# Patient Record
(demographics unavailable — no encounter records)

---

## 2025-02-20 NOTE — REASON FOR VISIT
[Follow-Up Visit] : a follow-up visit for [FreeTextEntry2] : painful plantar keratodermas, onychomycosis, tinea pedis

## 2025-02-20 NOTE — PROCEDURE
[FreeTextEntry1] : 1) painful plantar keratodermas: aseptic surgical debridement performed to lesions, patient recommended use of topical CLS compound at least twice daily with manual scrubbing nightly and repeat regimen consistently until cleared 2) Onychomycosis with elongated painful toenails: aseptic debridement of elongated mycotic painful toenails x 5, Rx: tavaborole solution  Patient tolerated all treatments well and without any complications

## 2025-02-20 NOTE — ASSESSMENT
[FreeTextEntry1] : 1) painful plantar keratodermas: aseptic surgical debridement performed to lesions, patient recommended use of topical CLS compound at least twice daily with manual scrubbing nightly and repeat regimen consistently until cleared 2) Onychomycosis with elongated painful toenails: aseptic debridement of elongated mycotic painful toenails x 5, Rx: tavaborole solution 3) Tinea pedis: continue topical oxiconazole BID, use cotton socks and well ventilated shoe gear  Patient tolerated all treatments well and without any complications Follow up in 2 months [Verbal] : verbal [Patient] : patient [Good - alert, interested, motivated] : Good - alert, interested, motivated [Verbalizes knowledge/Understanding] : Verbalizes knowledge/understanding [Skin Care] : skin care [Pt responsibility to plan of care] : patient responsibility to plan of care

## 2025-02-20 NOTE — HISTORY OF PRESENT ILLNESS
[FreeTextEntry1] : Mr. CHELI ELAM is a 58 year M who is seen in the office for painful plantar keratodermas, onychomycosis, tinea pedis. Patient denies any current or recent f/c/n/v/sob/chest pain. AAOx3 and in NAD. Patient mentions topical medications are helpful for the hardened keratodermas. Patient mentions he needs refill for topical tavaborole solution for onychomycosis.

## 2025-02-20 NOTE — REVIEW OF SYSTEMS
[Skin Lesions] : skin lesion [Skin Wound] : no skin wound [Itching] : itching [Negative] : Psychiatric

## 2025-02-20 NOTE — PHYSICAL EXAM
[General Appearance - Alert] : alert [General Appearance - In No Acute Distress] : in no acute distress [General Appearance - Well Nourished] : well nourished [General Appearance - Well Developed] : well developed [General Appearance - Well-Appearing] : healthy appearing [] : normal voice and communication [Delayed in the Right Toes] : capillary refills normal in right toes [Delayed in the Left Toes] : capillary refills normal in the left toes [Skin Turgor] : normal skin turgor [Foot Ulcer] : no foot ulcer [Oriented To Time, Place, And Person] : oriented to person, place, and time [Impaired Insight] : insight and judgment were intact [Affect] : the affect was normal

## 2025-04-15 NOTE — ASSESSMENT
[FreeTextEntry1] : 1) painful plantar keratodermas: aseptic surgical debridement performed to lesions, patient recommended use of topical CLS compound at least twice daily with manual scrubbing nightly and repeat regimen consistently until cleared 2) Onychomycosis with elongated painful toenails: aseptic debridement of elongated mycotic painful toenails x 6, continue tavaborole solution daily 3) Metatarsalgia: recommend use of proper fit shoes with accommodative cushioned insoles/orthotics to offload plantar forefoot pressures, applied and educated patient on use of offloading relief donut hole felt pad to use daily Right submet 5th, patient assessed and 3D scanned for full length accommodative custom orthotics, educated patient on proper use and break in period, patient fully understood 4) Tinea pedis: continue topical oxiconazole BID, use cotton socks and well ventilated shoe gear  Patient tolerated all treatments well and without any complications Follow up in 1 month  [Verbal] : verbal [Patient] : patient [Good - alert, interested, motivated] : Good - alert, interested, motivated [Verbalizes knowledge/Understanding] : Verbalizes knowledge/understanding [Skin Care] : skin care [Off-loading] : off-loading [Pt responsibility to plan of care] : patient responsibility to plan of care

## 2025-04-15 NOTE — REASON FOR VISIT
[Follow-Up Visit] : a follow-up visit for [FreeTextEntry2] : painful plantar keratodermas, onychomycosis, tinea pedis, metatarsalgia

## 2025-04-15 NOTE — REVIEW OF SYSTEMS
[Joint Swelling] : no joint swelling [Joint Stiffness] : no joint stiffness [Limb Swelling] : no limb swelling [Skin Lesions] : skin lesion [Skin Wound] : no skin wound [Itching] : no itching [Dry Skin] : dry skin [Confused] : no confusion [Convulsions] : no convulsions [Dizziness] : no dizziness [Fainting] : no fainting [Negative] : Psychiatric

## 2025-04-15 NOTE — HISTORY OF PRESENT ILLNESS
[FreeTextEntry1] : Mr. CHELI ELAM is a 58 year M who is seen in the office for painful plantar keratodermas, onychomycosis, tinea pedis, metatarsalgia. Patient denies any current or recent f/c/n/v/sob/chest pain. AAOx3 and in NAD. Patient mentions topical medications are helpful for the hardened keratodermas. Patient mentions metatarsalgia pain, especially to Right foot submet 5th.

## 2025-04-15 NOTE — PROCEDURE
[FreeTextEntry1] : 1) painful plantar keratodermas: aseptic surgical debridement performed to lesions, patient recommended use of topical CLS compound at least twice daily with manual scrubbing nightly and repeat regimen consistently until cleared 2) Onychomycosis with elongated painful toenails: aseptic debridement of elongated mycotic painful toenails x 6, continue tavaborole solution daily 3) Metatarsalgia: recommend use of proper fit shoes with accommodative cushioned insoles/orthotics to offload plantar forefoot pressures, applied and educated patient on use of offloading relief donut hole felt pad to use daily Right submet 5th, patient assessed and 3D scanned for full length accommodative custom orthotics, educated patient on proper use and break in period, patient fully understood  Patient tolerated all treatments well and without any complications

## 2025-05-13 NOTE — PROCEDURE
[FreeTextEntry1] : 1) painful plantar keratodermas: aseptic surgical debridement performed to lesions x 4 done 5/12/25, patient recommended use of topical CLS compound at least twice daily with manual scrubbing nightly and repeat regimen consistently until cleared 2) Onychomycosis: aseptic debridement of elongated mycotic toenails with pain/discomfort x 2 done 5/12/25, continue tavaborole solution daily  Patient tolerated all treatments well and without any complications

## 2025-05-13 NOTE — PHYSICAL EXAM
[General Appearance - Alert] : alert [General Appearance - In No Acute Distress] : in no acute distress [General Appearance - Well Nourished] : well nourished [General Appearance - Well Developed] : well developed [General Appearance - Well-Appearing] : healthy appearing [Ankle Swelling On The Right] : mild [Delayed in the Right Toes] : capillary refills normal in right toes [Delayed in the Left Toes] : capillary refills normal in the left toes [] : normal strength/tone [de-identified] : ROM of ankle, subtalar, midtarsal, MPJ, IPJ are adequate and nontender bilateral 5/5 muscle power in inversion, eversion, dorsiflexion, plantarflexion bilateral [Skin Turgor] : normal skin turgor [Foot Ulcer] : no foot ulcer [FreeTextEntry1] : gross epicritic sensation to feet diminished, light touch sensation intact, sharp/dull sensation intact [Oriented To Time, Place, And Person] : oriented to person, place, and time [Impaired Insight] : insight and judgment were intact [Affect] : the affect was normal

## 2025-05-13 NOTE — ASSESSMENT
[FreeTextEntry1] : 1) painful plantar keratodermas: aseptic surgical debridement performed to lesions x 4 done 5/12/25, patient recommended use of topical CLS compound at least twice daily with manual scrubbing nightly and repeat regimen consistently until cleared 2) Onychomycosis: aseptic debridement of elongated mycotic toenails with pain/discomfort x 2 done 5/12/25, continue tavaborole solution daily 3) Metatarsalgia: recommend use of proper fit shoes with accommodative cushioned insoles/orthotics to offload plantar forefoot pressures 4) Tinea pedis: continue topical oxiconazole BID, use cotton socks and well ventilated shoe gear  Patient with class findings of decreased pedal hair growth, thickened toenail changes, pedal skin atrophy and discoloration, cool feet, and edema. Patient tolerated all treatments well and without any complications  [Verbal] : verbal [Patient] : patient [Good - alert, interested, motivated] : Good - alert, interested, motivated [Verbalizes knowledge/Understanding] : Verbalizes knowledge/understanding [Pt responsibility to plan of care] : patient responsibility to plan of care

## 2025-07-18 NOTE — ASSESSMENT
[FreeTextEntry1] : 1) Plantar keratodermas: aseptic surgical debridement performed to lesions x 4 done 07/14/2025, patient recommended continued use of topical CLS compound at least twice daily with manual scrubbing nightly and repeat regimen consistently until cleared 2) Metatarsalgia: recommend use of proper fit shoes with accommodative cushioned insoles/orthotics to offload plantar forefoot pressures 3) Tinea pedis: continue topical oxiconazole BID, use cotton socks and well ventilated shoe gear  Patient tolerated all treatments well and without any complications Follow up in 1 month   [Verbal] : verbal [Patient] : patient [Good - alert, interested, motivated] : Good - alert, interested, motivated [Verbalizes knowledge/Understanding] : Verbalizes knowledge/understanding [Skin Care] : skin care [Pt responsibility to plan of care] : patient responsibility to plan of care

## 2025-07-18 NOTE — PHYSICAL EXAM
[General Appearance - Alert] : alert [General Appearance - In No Acute Distress] : in no acute distress [General Appearance - Well Nourished] : well nourished [General Appearance - Well Developed] : well developed [General Appearance - Well-Appearing] : healthy appearing [Ankle Swelling On The Right] : mild [Delayed in the Right Toes] : capillary refills normal in right toes [Delayed in the Left Toes] : capillary refills normal in the left toes [] : normal strength/tone [de-identified] : ROM of ankle, subtalar, midtarsal, MPJ, IPJ are adequate bilateral 5/5 muscle power in inversion, eversion, dorsiflexion, plantarflexion bilateral [Skin Turgor] : normal skin turgor [Foot Ulcer] : no foot ulcer [FreeTextEntry1] : gross epicritic sensation to feet diminished, light touch sensation intact, sharp/dull sensation intact [Oriented To Time, Place, And Person] : oriented to person, place, and time [Impaired Insight] : insight and judgment were intact [Affect] : the affect was normal

## 2025-07-18 NOTE — PROCEDURE
[FreeTextEntry1] : 1) Plantar keratodermas: aseptic surgical debridement performed to lesions x 4 done 07/14/2025, patient recommended continued use of topical CLS compound at least twice daily with manual scrubbing nightly and repeat regimen consistently until cleared  Patient tolerated all treatments well and without any complications

## 2025-07-18 NOTE — HISTORY OF PRESENT ILLNESS
[FreeTextEntry1] : Mr. CHELI ELAM is a 58 year M who is seen in the office for plantar keratodermas, tinea pedis, metatarsalgia. Patient denies any current or recent f/c/n/v/sob/chest pain. AAOx3 and in NAD.